# Patient Record
Sex: MALE | Race: WHITE
[De-identification: names, ages, dates, MRNs, and addresses within clinical notes are randomized per-mention and may not be internally consistent; named-entity substitution may affect disease eponyms.]

---

## 2018-10-11 ENCOUNTER — HOSPITAL ENCOUNTER (EMERGENCY)
Dept: HOSPITAL 33 - ED | Age: 63
Discharge: HOME | End: 2018-10-11
Payer: MEDICARE

## 2018-10-11 VITALS — OXYGEN SATURATION: 94 % | HEART RATE: 88 BPM | DIASTOLIC BLOOD PRESSURE: 86 MMHG | SYSTOLIC BLOOD PRESSURE: 166 MMHG

## 2018-10-11 DIAGNOSIS — N13.2: Primary | ICD-10-CM

## 2018-10-11 DIAGNOSIS — Z87.442: ICD-10-CM

## 2018-10-11 LAB
ALBUMIN SERPL-MCNC: 4 G/DL (ref 3.5–5)
ALP SERPL-CCNC: 116 U/L (ref 38–126)
ALT SERPL-CCNC: 39 U/L (ref 0–50)
AMYLASE SERPL-CCNC: 84 U/L (ref 30–110)
ANION GAP SERPL CALC-SCNC: 11.8 MEQ/L (ref 5–15)
AST SERPL QL: 50 U/L (ref 17–59)
BASOPHILS # BLD AUTO: 0.03 10*3/UL (ref 0–0.4)
BASOPHILS NFR BLD AUTO: 0.3 % (ref 0–0.4)
BILIRUB BLD-MCNC: 0.6 MG/DL (ref 0.2–1.3)
BUN SERPL-MCNC: 15 MG/DL (ref 9–20)
CALCIUM SPEC-MCNC: 9 MG/DL (ref 8.4–10.2)
CHLORIDE SERPL-SCNC: 103 MMOL/L (ref 98–107)
CO2 SERPL-SCNC: 26 MMOL/L (ref 22–30)
CREAT SERPL-MCNC: 0.92 MG/DL (ref 0.66–1.25)
EOSINOPHIL # BLD AUTO: 0.19 10*3/UL (ref 0–0.5)
GLUCOSE SERPL-MCNC: 95 MG/DL (ref 74–106)
GLUCOSE UR-MCNC: NEGATIVE MG/DL
GRANULOCYTES # BLD AUTO: 6.37 10*3/UL (ref 1.4–6.9)
HCT VFR BLD AUTO: 44.7 % (ref 42–50)
HGB BLD-MCNC: 15 GM/DL (ref 12.5–18)
LIPASE SERPL-CCNC: 464 U/L (ref 23–300)
LYMPHOCYTES # SPEC AUTO: 1.95 10*3/UL (ref 1–4.6)
MCH RBC QN AUTO: 28.5 PG (ref 26–32)
MCHC RBC AUTO-ENTMCNC: 33.6 G/DL (ref 32–36)
MONOCYTES # BLD AUTO: 0.95 10*3/UL (ref 0–1.3)
NEUTROPHILS NFR BLD AUTO: 67.2 % (ref 36–66)
PLATELET # BLD AUTO: 198 K/MM3 (ref 150–450)
POTASSIUM SERPLBLD-SCNC: 3.6 MMOL/L (ref 3.5–5.1)
PROT SERPL-MCNC: 6.7 G/DL (ref 6.3–8.2)
PROT UR STRIP-MCNC: 100 MG/DL
RBC # BLD AUTO: 5.26 M/MM3 (ref 4.1–5.6)
SODIUM SERPL-SCNC: 138 MMOL/L (ref 137–145)
WBC # BLD AUTO: 9.5 K/MM3 (ref 4–10.5)

## 2018-10-11 PROCEDURE — 96374 THER/PROPH/DIAG INJ IV PUSH: CPT

## 2018-10-11 PROCEDURE — 96360 HYDRATION IV INFUSION INIT: CPT

## 2018-10-11 PROCEDURE — 82150 ASSAY OF AMYLASE: CPT

## 2018-10-11 PROCEDURE — 96361 HYDRATE IV INFUSION ADD-ON: CPT

## 2018-10-11 PROCEDURE — 85025 COMPLETE CBC W/AUTO DIFF WBC: CPT

## 2018-10-11 PROCEDURE — 87040 BLOOD CULTURE FOR BACTERIA: CPT

## 2018-10-11 PROCEDURE — 80053 COMPREHEN METABOLIC PANEL: CPT

## 2018-10-11 PROCEDURE — 36000 PLACE NEEDLE IN VEIN: CPT

## 2018-10-11 PROCEDURE — 96375 TX/PRO/DX INJ NEW DRUG ADDON: CPT

## 2018-10-11 PROCEDURE — 83690 ASSAY OF LIPASE: CPT

## 2018-10-11 PROCEDURE — 81001 URINALYSIS AUTO W/SCOPE: CPT

## 2018-10-11 PROCEDURE — 74176 CT ABD & PELVIS W/O CONTRAST: CPT

## 2018-10-11 PROCEDURE — 87086 URINE CULTURE/COLONY COUNT: CPT

## 2018-10-11 PROCEDURE — 36415 COLL VENOUS BLD VENIPUNCTURE: CPT

## 2018-10-11 PROCEDURE — 99284 EMERGENCY DEPT VISIT MOD MDM: CPT

## 2018-10-11 NOTE — XRAY
Indication: Right lower quadrant pain.  History of stones.



Multiple contiguous axial images obtained through the abdomen and pelvis

without contrast using renal stone protocol.



Comparison: July 21, 2013.



Lung bases demonstrates minimal bibasilar dependent atelectasis with stable

tiny left posterior calcified granuloma.  No infiltrate or effusion.  Heart is

not enlarged.



There is now a 3 mm calculus in the proximal right ureter, approximately L3

level with mild hydronephrosis and mild perinephric stranding.  Stable

nonobstructing left renal micro-calculus.



Noncontrasted stomach and bowel loops appear nonobstructed.  Previous reported

appendectomy.  Again mild scattered descending and sigmoid diverticulosis.  No

free fluid/air.



Remaining liver, gallbladder, pancreas, spleen, adrenal glands, kidneys,

ureters, and bladder appear unremarkable for noncontrast exam.  There remains

mild aortoiliac calcifications without AAA.



Osseous structures intact with stable L5-S1 degenerative changes.  Interval

L1/L2 kyphoplasty with right L1/L2 cement material extending beyond the

anterior bony cortex.  Stable small fatty umbilical hernia.



Impression:

1.  New 3 mm proximal right ureter calculus producing obstructive uropathy as

detailed.  Stable nonobstructing left renal micro-calculus.

2.  Stable colonic diverticulosis and small fatty umbilical hernia.

3.  Interval L1/L2 kyphoplasty.



Comment: Preliminary interpretation was made by [facility].  No critical discrepancy.



CT DI 22.89

## 2018-10-11 NOTE — ERPHSYRPT
- History of Present Illness


Historian: patient


Exam Limitations: clinical condition (PATIENT )


Patient Subjective Stated Complaint: woke up with pain in the right lower 

abdomen


Triage Nursing Assessment: Pt arrived by EMS with c/o waking up with pain in 

the lower right quadrant of abdomen, hx of kidney stones, pain with palpation, 

unsure if radiates to the back due to chronic back pain, bowel sounds heard in 

all 4 quadrants


Timing/Duration: yesterday


Activities at Onset: none


Quality: sharpness, stabbing


Abdominal Pain Onset Location: RLQ, generalized abdomen


Pain Radiation: no radiation


Severity of Pain-Max: moderate


Severity of Pain-Current: moderate


Modifying Factors: Improves With: nothing, analgesics


Associated Symptoms: back, nausea


Hx Tetanus, Diphtheria Vaccination/Date Given: Yes


Hx Influenza Vaccination/Date Given: No


Hx Pneumococcal Vaccination/Date Given: No





<LIS MORENO - Last Filed: 10/11/18 06:53>





<ROSA MONTOYA - Last Filed: 10/11/18 07:21>





- History of Present Illness


Time Seen by Provider: 10/11/18 05:30


Physician History: 





PATIENT WITH HISTORY OF KIDNEY STONES,  DEGENERATIVE DISC DISEASE,  COMPLAINS 

OF RIGHT LOWER ABDOMINAL PAINS SINCE 2130,  ASSOCIATED WITH NAUSEA,  PAIN SCALE 

7/10.  DENIES FEVER, CHILLS, URINARY SYMPTOMS. PATIENT HAS HAD PREVIOUS 

LITHROTRIPSY AND RENAL STENT INSERTION. (LIS MORENO)


Allergies/Adverse Reactions: 








No Known Drug Allergies Allergy (Verified 10/11/18 05:25)


 





Home Medications: 








Hydrocodone/Acetaminophen [Norco 5-325 Tablet] 1 each PO Q6H 10/11/18 [History]








- Review of Systems


Constitutional: No Fever, No Chills


Eyes: No Symptoms


Ears, Nose, & Throat: No Symptoms


Respiratory: No Symptoms, No Cough, No Dyspnea


Cardiac: No Symptoms, No Chest Pain, No Edema, No Syncope


Abdominal/Gastrointestinal: Abdominal Pain, No Nausea, No Vomiting, No Diarrhea


Genitourinary Symptoms: No Symptoms, No Dysuria


Musculoskeletal: No Symptoms, No Back Pain, No Neck Pain


Skin: No Rash


Neurological: No Dizziness, No Focal Weakness, No Sensory Changes


Psychological: No Symptoms


Endocrine: No Symptoms


All Other Systems: Reviewed and Negative





<LIS MORENO - Last Filed: 10/11/18 06:53>





- Past Medical History


Pertinent Past Medical History: Yes


Neurological History: No Pertinent History


ENT History: No Pertinent History


Cardiac History: No Pertinent History, Myocardial Infarction (MI), Other


Respiratory History: No Pertinent History


Endocrine Medical History: No Pertinent History


Musculoskeletal History: No Pertinent History


GI Medical History: No Pertinent History, GI Bleed


 History: No Pertinent History


Psycho-Social History: No Pertinent History


Male Reproductive Disorders: No Pertinent History


Other Medical History: kidney stones x 3,mvp, reports heart attack years ago





- Past Surgical History


Past Surgical History: Yes


Neuro Surgical History: No Pertinent History


Cardiac: No Pertinent History


Respiratory: No Pertinent History


Gastrointestinal: Appendectomy, Bowel Surgery


Genitourinary: Other


Musculoskeletal: Orthopedic Surgery


Male Surgical History: No Pertinent History


Other Surgical History: Pt states "i had to have ,because of infection, some of 

my bowel and piece of the bladder removed and also appy at the same time".  

States "kyphoplasty L-3 and either L-1 or 2  Nov. 2016





- Social History


Smoking Status: Current every day smoker


How long have you smoked: 50 yrs


Exposure to second hand smoke: Yes


Drug Use: none


Patient Lives Alone: Yes





<TIFFANIELIS - Last Filed: 10/11/18 06:53>





- Physical Exam


General Appearance: moderate distress


Eye Exam: PERRL/EOMI, eyes nml inspection


Ears, Nose, Throat Exam: normal ENT inspection, pharynx normal, moist mucous 

membranes


Neck Exam: normal inspection, non-tender, supple, full range of motion


Respiratory Exam: normal breath sounds, lungs clear, No respiratory distress


Cardiovascular Exam: regular rate/rhythm, normal heart sounds


Gastrointestinal/Abdomen Exam: soft, normal bowel sounds, tenderness (RIGHT 

LOWER QUAD TENDERNESS), No mass


Back Exam: normal inspection, normal range of motion, No CVA tenderness, No 

vertebral tenderness


Extremity Exam: normal inspection, normal range of motion, pelvis stable


Neurologic Exam: alert, oriented x 3, cooperative, normal mood/affect, nml 

cerebellar function, sensation nml, No motor deficits


Skin Exam: normal color, warm, dry


**SpO2 Interpretation**: normal


SpO2: 97


Oxygen Delivery: Room Air





<TIFFANIELIS - Last Filed: 10/11/18 06:53>





- Nursing Vital Signs


Nursing Vital Signs: 





 Initial Vital Signs











Temperature  97.9 F   10/11/18 05:16


 


Pulse Rate  86   10/11/18 05:16


 


Blood Pressure  178/106   10/11/18 05:16


 


O2 Sat by Pulse Oximetry  97   10/11/18 05:16








 Pain Scale











Pain Intensity                 8

















<LIS MORENO - Last Filed: 10/11/18 06:53>





- Course


Nursing assessment & vital signs reviewed: Yes





<ROSA MONTOYA - Last Filed: 10/11/18 07:21>


Ordered Tests: 





 Active Orders 24 hr











 Category Date Time Status


 


 Clean Catch Urine Specimen STAT Care  10/11/18 05:37 Active


 


 IV Insertion STAT Care  10/11/18 05:37 Active


 


 ABDOMEN AND PELVIS W/0 CONTRAS [CT] Stat Exams  10/11/18 05:38 Taken


 


 AMYLASE Stat Lab  10/11/18 05:25 Completed


 


 BLOOD CULTURE Stat Lab  10/11/18 05:58 Received


 


 CBC W DIFF Stat Lab  10/11/18 05:25 Completed


 


 CMP Stat Lab  10/11/18 05:25 Completed


 


 LIPASE Stat Lab  10/11/18 05:25 Completed


 


 UA W/RFX UR CULTURE Stat Lab  10/11/18 06:50 Ordered








Medication Summary











Generic Name Dose Route Start Last Admin





  Trade Name Freq  PRN Reason Stop Dose Admin


 


Sodium Chloride  1,000 mls @ 500 mls/hr  10/11/18 05:37  10/11/18 05:46





  Sodium Chloride 0.9% 1000 Ml  IV  10/11/18 07:36  500 mls/hr





  .Q2H STA   Administration





     





     





     





     














Discontinued Medications














Generic Name Dose Route Start Last Admin





  Trade Name Freq  PRN Reason Stop Dose Admin


 


Hydromorphone HCl  1 mg  10/11/18 05:37  10/11/18 05:47





  Hydromorphone 1 Mg/Ml Ampule***  IV  10/11/18 05:38  1 mg





  STAT ONE   Administration





     





     





     





     


 


Hydromorphone HCl  Confirm  10/11/18 05:43  





  Hydromorphone 1 Mg/Ml Ampule***  Administered  10/11/18 05:44  





  Dose   





  1 mg   





  .ROUTE   





  .STK-MED ONE   





     





     





     





     


 


Sodium Chloride  Confirm  10/11/18 05:43  





  Sodium Chloride 0.9% 1000 Ml  Administered  10/11/18 05:44  





  Dose   





  1,000 mls @ ud   





  .ROUTE   





  .STK-MED ONE   





     





     





     





     


 


Ondansetron HCl  4 mg  10/11/18 05:37  10/11/18 05:47





  Zofran 4 Mg/2 Ml Vial**  IV  10/11/18 05:38  4 mg





  STAT ONE   Administration





     





     





     





     


 


Ondansetron HCl  Confirm  10/11/18 05:43  





  Zofran 4 Mg/2 Ml Vial**  Administered  10/11/18 05:44  





  Dose   





  4 mg   





  .ROUTE   





  .STK-MED ONE   





     





     





     





     











Lab/Rad Data: 





 Laboratory Result Diagrams





 10/11/18 05:25 





 10/11/18 05:25 





 Laboratory Results











  10/11/18 10/11/18 Range/Units





  05:25 05:25 


 


WBC   9.5  (4.0-10.5)  K/mm3


 


RBC   5.26  (4.1-5.6)  M/mm3


 


Hgb   15.0  (12.5-18.0)  gm/dl


 


Hct   44.7  (42-50)  %


 


MCV   85.0  ()  fl


 


MCH   28.5  (26-32)  pg


 


MCHC   33.6  (32-36)  g/dl


 


RDW   12.9  (11.5-14.0)  %


 


Plt Count   198  (150-450)  K/mm3


 


MPV   10.1 H  (6-9.5)  fl


 


Gran %   67.2 H  (36.0-66.0)  %


 


Eos # (Auto)   0.19  (0-0.5)  


 


Absolute Lymphs (auto)   1.95  (1.0-4.6)  


 


Absolute Monos (auto)   0.95  (0.0-1.3)  


 


Lymphocytes %   20.5 L  (24.0-44.0)  %


 


Monocytes %   10.0  (0.0-12.0)  %


 


Eosinophils %   2.0  (0.00-5.0)  %


 


Basophils %   0.3  (0.0-0.4)  %


 


Absolute Granulocytes   6.37  (1.4-6.9)  


 


Basophils #   0.03  (0-0.4)  


 


Sodium  138   (137-145)  mmol/L


 


Potassium  3.6   (3.5-5.1)  mmol/L


 


Chloride  103   ()  mmol/L


 


Carbon Dioxide  26   (22-30)  mmol/L


 


Anion Gap  11.8   (5-15)  MEQ/L


 


BUN  15   (9-20)  mg/dL


 


Creatinine  0.92   (0.66-1.25)  mg/dL


 


Estimated GFR  > 60.0   ML/MIN


 


Glucose  95   ()  mg/dL


 


Calcium  9.0   (8.4-10.2)  mg/dL


 


Total Bilirubin  0.60   (0.2-1.3)  mg/dL


 


AST  50   (17-59)  U/L


 


ALT  39   (0-50)  U/L


 


Alkaline Phosphatase  116   ()  U/L


 


Serum Total Protein  6.7   (6.3-8.2)  g/dL


 


Albumin  4.0   (3.5-5.0)  g/dL


 


Amylase  84   ()  U/L


 


Lipase  464 H   ()  U/L











ct scan abd/pelvis-mild right hydronephrosis and hydroureter with 3mm prox 

right ureteral stone (ROSA MONTOYA)





<LIS MORENO - Last Filed: 10/11/18 06:53>





- Progress


Counseled pt/family regarding: lab results, diagnosis, need for follow-up





<ROSA MONTOYA - Last Filed: 10/11/18 07:21>





- Progress


Progress Note: 





10/11/18 06:53


IV NORMAL SALINE 500ML/HR, ZOFRAN 4MG, DILAUDID 1MG IV, PAIN MARKEDLY IMPROVED.


10/11/18 07:00,  PATIENT ENDORSED TO DR MONTOYA AT 0700


 (LIS MORENO)








10/11/18 07:17


pt very comfortable.  (ROSA MONTOYA)





<LIS MORENO - Last Filed: 10/11/18 06:53>





- Departure


Time of Disposition: 07:18


Departure Disposition: Home


Critical Care Time: No





<ROSA MONTOYA - Last Filed: 10/11/18 07:21>





- Departure


Clinical Impression: 


 Ureterolithiasis





Condition: Stable


Referrals: 


KRISSY KENDALL [Primary Care Provider] - 


Additional Instructions: 


drink plenty of fluids. use your hydrocodone as prescribed. follow up with 

urologist for further management.

## 2020-02-27 ENCOUNTER — HOSPITAL ENCOUNTER (EMERGENCY)
Dept: HOSPITAL 33 - ED | Age: 65
Discharge: HOME | End: 2020-02-27
Payer: MEDICARE

## 2020-02-27 VITALS — DIASTOLIC BLOOD PRESSURE: 85 MMHG | HEART RATE: 81 BPM | SYSTOLIC BLOOD PRESSURE: 132 MMHG

## 2020-02-27 VITALS — OXYGEN SATURATION: 95 %

## 2020-02-27 DIAGNOSIS — J44.1: ICD-10-CM

## 2020-02-27 DIAGNOSIS — J11.1: Primary | ICD-10-CM

## 2020-02-27 LAB
ALBUMIN SERPL-MCNC: 4.1 G/DL (ref 3.5–5)
ALP SERPL-CCNC: 110 U/L (ref 38–126)
ALT SERPL-CCNC: 35 U/L (ref 0–50)
ANION GAP SERPL CALC-SCNC: 11.9 MEQ/L (ref 5–15)
AST SERPL QL: 52 U/L (ref 17–59)
BILIRUB BLD-MCNC: 0.6 MG/DL (ref 0.2–1.3)
BUN SERPL-MCNC: 12 MG/DL (ref 9–20)
CALCIUM SPEC-MCNC: 8.4 MG/DL (ref 8.4–10.2)
CELLS COUNTED: 100
CHLORIDE SERPL-SCNC: 105 MMOL/L (ref 98–107)
CO2 SERPL-SCNC: 24 MMOL/L (ref 22–30)
CREAT SERPL-MCNC: 0.94 MG/DL (ref 0.66–1.25)
FLUAV AG NPH QL IA: NEGATIVE
FLUBV AG NPH QL IA: POSITIVE
GLUCOSE SERPL-MCNC: 112 MG/DL (ref 74–106)
HCT VFR BLD AUTO: 45 % (ref 42–50)
HGB BLD-MCNC: 15 GM/DL (ref 12.5–18)
MANUAL DIF COMMENT BLD-IMP: NORMAL
MCH RBC QN AUTO: 28 PG (ref 26–32)
MCHC RBC AUTO-ENTMCNC: 33.3 G/DL (ref 32–36)
NEUTS BAND # BLD MANUAL: 4 % (ref 0–2)
PLATELET # BLD AUTO: 154 K/MM3 (ref 150–450)
POTASSIUM SERPLBLD-SCNC: 3.6 MMOL/L (ref 3.5–5.1)
PROT SERPL-MCNC: 7.3 G/DL (ref 6.3–8.2)
RBC # BLD AUTO: 5.36 M/MM3 (ref 4.1–5.6)
RSV AG SPEC QL IA: NEGATIVE
SODIUM SERPL-SCNC: 137 MMOL/L (ref 137–145)
WBC # BLD AUTO: 3.9 K/MM3 (ref 4–10.5)

## 2020-02-27 PROCEDURE — 94640 AIRWAY INHALATION TREATMENT: CPT

## 2020-02-27 PROCEDURE — 87651 STREP A DNA AMP PROBE: CPT

## 2020-02-27 PROCEDURE — 36415 COLL VENOUS BLD VENIPUNCTURE: CPT

## 2020-02-27 PROCEDURE — 99284 EMERGENCY DEPT VISIT MOD MDM: CPT

## 2020-02-27 PROCEDURE — 36000 PLACE NEEDLE IN VEIN: CPT

## 2020-02-27 PROCEDURE — 94150 VITAL CAPACITY TEST: CPT

## 2020-02-27 PROCEDURE — 87631 RESP VIRUS 3-5 TARGETS: CPT

## 2020-02-27 PROCEDURE — 96374 THER/PROPH/DIAG INJ IV PUSH: CPT

## 2020-02-27 PROCEDURE — 71046 X-RAY EXAM CHEST 2 VIEWS: CPT

## 2020-02-27 PROCEDURE — 85025 COMPLETE CBC W/AUTO DIFF WBC: CPT

## 2020-02-27 PROCEDURE — 93005 ELECTROCARDIOGRAM TRACING: CPT

## 2020-02-27 PROCEDURE — 80053 COMPREHEN METABOLIC PANEL: CPT

## 2020-02-27 PROCEDURE — 83605 ASSAY OF LACTIC ACID: CPT

## 2020-02-27 NOTE — XRAY
Exam: Two-view chest from 2/27/2020.



Comparison: None.



Indication: 64-year-old male with cough for 5 days, fever, no history of

surgery, smoker.



Findings: Upright PA and lateral chest films are submitted for evaluation.



The heart size and contour are normal.  Atherosclerotic calcification is seen

within the aortic knob.  The abrahan and mediastinal structures appear

unremarkable.  The lungs are well inflated.  No air space infiltrates,

vascular congestion, pneumothorax, or pleural fluid is seen.  There is minor

eventration of the anterior aspect of the right hemidiaphragm.  EKG leads are

seen in place.



There appears to have probably been prior vertebroplasty within the upper

lumbar spine.  Correlate with history.  The bones are demineralized, but

otherwise appear intact.



Impression:



1.  No infiltrates to suggest pneumonia or other acute cardiopulmonary disease

is seen.

## 2020-02-27 NOTE — ERPHSYRPT
- History of Present Illness


Time Seen by Provider: 02/27/20 11:11


Source: patient


Exam Limitations: no limitations


Patient Subjective Stated Complaint: states since sunday has had a cough and 

fever.  also having sharp pain under left scapula from coughing.  states cough 

is nonproductive.


Triage Nursing Assessment: ambulated to room per self.  skin w/d, color normal.

  resp nonlabored.  dry occasional cough noted.


Physician History: 





64 years old male with history of tobacco abuse presented in the ER with chief 

complaint of fever chills, nasal congestion/sore throat and worsening dry 

hacking cough for the last 4 days.  Patient has been using over-the-counter 

meds with no significant relief.  Because of repeated coughing he is having 

chest soreness and feels as if he pulled his back muscles as well.  Denies any 

abdominal pain nausea or vomiting.  Minimal shortness of breath which is not 

any worse than usual.  Denies any chest pain otherwise.  No palpitations.


Timing/Duration: day(s) (4)


Cough Quality/Degree: moderate, dry cough


Possible Cause: no prior episodes


Associated Symptoms: chest pain/soreness, cough, muscle aches, nasal congestion


Allergies/Adverse Reactions: 








No Known Drug Allergies Allergy (Verified 02/27/20 11:08)


 





Hx Tetanus, Diphtheria Vaccination/Date Given: Yes


Hx Influenza Vaccination/Date Given: No


Hx Pneumococcal Vaccination/Date Given: No





- Review of Systems


Constitutional: Fever, Chills, Fatigue, Malaise


Eyes: No Symptoms


Ears, Nose, & Throat: No Symptoms


Respiratory: No Symptoms, Cough, Dyspnea


Cardiac: No Symptoms


Abdominal/Gastrointestinal: No Symptoms


Genitourinary Symptoms: No Symptoms


Musculoskeletal: Back Pain, Myalgias


Skin: No Symptoms


Neurological: No Symptoms


Psychological: No Symptoms


Endocrine: No Symptoms


Hematologic/Lymphatic: No Symptoms


Immunological/Allergic: No Symptoms





- Past Medical History


Pertinent Past Medical History: Yes


Neurological History: No Pertinent History


ENT History: No Pertinent History


Cardiac History: No Pertinent History, Myocardial Infarction (MI), Other


Respiratory History: No Pertinent History


Endocrine Medical History: No Pertinent History


Musculoskeletal History: No Pertinent History


GI Medical History: No Pertinent History, GI Bleed


 History: No Pertinent History


Psycho-Social History: No Pertinent History


Male Reproductive Disorders: No Pertinent History


Other Medical History: kidney stones x 3,mvp, reports heart attack years ago





- Past Surgical History


Past Surgical History: Yes


Neuro Surgical History: No Pertinent History


Cardiac: No Pertinent History


Respiratory: No Pertinent History


Gastrointestinal: Appendectomy, Bowel Surgery


Genitourinary: Other


Musculoskeletal: Orthopedic Surgery


Male Surgical History: No Pertinent History


Other Surgical History: Pt states "i had to have ,because of infection, some of 

my bowel and piece of the bladder removed and also appy at the same time".  

States "kyphoplasty L-3 and either L-1 or 2  Nov. 2016





- Social History


Smoking Status: Former smoker


How long have you smoked: 50 yrs


Exposure to second hand smoke: No


Drug Use: none


Patient Lives Alone: Yes





- Nursing Vital Signs


Nursing Vital Signs: 


 Initial Vital Signs











Temperature  100.4 F   02/27/20 10:57


 


Pulse Rate  106 H  02/27/20 10:57


 


Respiratory Rate  18   02/27/20 10:57


 


Blood Pressure  166/93   02/27/20 10:57


 


O2 Sat by Pulse Oximetry  95   02/27/20 10:57








 Pain Scale











Pain Intensity                 0

















- Physical Exam


General Appearance: no apparent distress


Eye Exam: PERRL/EOMI, eyes nml inspection


Ears, Nose, Throat Exam: pharyngeal erythema


Neck Exam: normal inspection, non-tender, supple, full range of motion


Respiratory Exam: normal breath sounds, lungs clear, respiratory distress


Cardiovascular Exam: regular rate/rhythm, normal heart sounds


Gastrointestinal/Abdomen Exam: soft, normal bowel sounds


Back Exam: muscle spasm, No CVA tenderness


Extremity Exam: normal inspection


Neurologic Exam: alert, oriented x 3, cooperative


Skin Exam: normal color


**SpO2 Interpretation**: normal


SpO2: 95


O2 Delivery: Room Air





- Course


Nursing assessment & vital signs reviewed: Yes


EKG Interpreted by Me: RATE (99), NORMAL AXIS, NORMAL INTERVALS, NORMAL QRS, Q-

wave (inf leads)


Ordered Tests: 


 Active Orders 24 hr











 Category Date Time Status


 


 EKG-ER Only STAT Care  02/27/20 11:23 Active


 


 IV Insertion STAT Care  02/27/20 11:19 Active


 


 CHEST 2 VIEWS (PA AND LAT) Stat Exams  02/27/20 11:19 Completed


 


 CBC W DIFF Stat Lab  02/27/20 11:34 Completed


 


 CMP Stat Lab  02/27/20 11:34 Completed


 


 Lactic Acid Stat Lab  02/27/20 11:34 Completed


 


 Manual Differential NC Stat Lab  02/27/20 11:34 Completed


 


 Peak Expiratory Flow Rate ONCE RT  02/27/20 11:39 Completed


 


 Respiratory Therapy Assessment DAILY RT  02/27/20 11:39 Completed








Medication Summary














Discontinued Medications














Generic Name Dose Route Start Last Admin





  Trade Name Héctor  PRN Reason Stop Dose Admin


 


Acetaminophen  975 mg  02/27/20 11:19  02/27/20 11:26





  Tylenol 325 Mg***  PO  02/27/20 11:20  975 mg





  STAT ONE   Administration





     





     





     





     


 


Acetaminophen  Confirm  02/27/20 11:25  





  Tylenol 325 Mg***  Administered  02/27/20 11:26  





  Dose   





  975 mg   





  .ROUTE   





  .STK-MED ONE   





     





     





     





     


 


Albuterol/Ipratropium  3 ml  02/27/20 11:20  02/27/20 11:33





  Duoneb 0.5-3 Mg/3 Ml Neb**  IH  02/27/20 11:21  3 ml





  STAT ONE   Administration





     





     





     





     


 


Albuterol/Ipratropium  Confirm  02/27/20 11:30  





  Duoneb 0.5-3 Mg/3 Ml Neb**  Administered  02/27/20 11:31  





  Dose   





  3 ml   





  IH   





  .STK-MED ONE   





     





     





     





     


 


Methylprednisolone Sodium Succinate  125 mg  02/27/20 11:20  02/27/20 11:27





  Solu-Medrol 125 Mg***  IV  02/27/20 11:21  125 mg





  STAT ONE   Administration





     





     





     





     


 


Methylprednisolone Sodium Succinate  Confirm  02/27/20 11:25  





  Solu-Medrol 125 Mg***  Administered  02/27/20 11:26  





  Dose   





  125 mg   





  .ROUTE   





  .STK-MED ONE   





     





     





     





     


 


Oseltamivir Phosphate  75 mg  02/27/20 13:54  02/27/20 14:03





  Tamiflu 75mg Capsule***  PO  02/27/20 13:55  75 mg





  STAT ONE   Administration





     





     





     





     











Lab/Rad Data: 


 Laboratory Result Diagrams





 02/27/20 11:34 





 02/27/20 11:34 





 Laboratory Results











  02/27/20 02/27/20 02/27/20 Range/Units





  Unknown 12:20 11:34 


 


WBC     (4.0-10.5)  K/mm3


 


RBC     (4.1-5.6)  M/mm3


 


Hgb     (12.5-18.0)  gm/dl


 


Hct     (42-50)  %


 


MCV     ()  fl


 


MCH     (26-32)  pg


 


MCHC     (32-36)  g/dl


 


RDW     (11.5-14.0)  %


 


Plt Count     (150-450)  K/mm3


 


MPV     (7.5-11.0)  fl


 


Sodium    137  (137-145)  mmol/L


 


Potassium    3.6  (3.5-5.1)  mmol/L


 


Chloride    105  ()  mmol/L


 


Carbon Dioxide    24  (22-30)  mmol/L


 


Anion Gap    11.9  (5-15)  MEQ/L


 


BUN    12  (9-20)  mg/dL


 


Creatinine    0.94  (0.66-1.25)  mg/dL


 


Estimated GFR    > 60.0  ML/MIN


 


Glucose    112 H  ()  mg/dL


 


Lactic Acid     (0.4-2.0)  


 


Calcium    8.4  (8.4-10.2)  mg/dL


 


Total Bilirubin    0.60  (0.2-1.3)  mg/dL


 


AST    52  (17-59)  U/L


 


ALT    35  (0-50)  U/L


 


Alkaline Phosphatase    110  ()  U/L


 


Serum Total Protein    7.3  (6.3-8.2)  g/dL


 


Albumin    4.1  (3.5-5.0)  g/dL


 


Influenza Type A Ag  NEGATIVE    (NEGATIVE)  


 


Influenza Type B Ag  POSITIVE    (NEGATIVE)  


 


RSV (PCR)  NEGATIVE    (Negative)  


 


Group A Strep Antibody   NEGATIVE   (NEGATIVE)  














  02/27/20 02/27/20 Range/Units





  11:34 11:34 


 


WBC   3.9 L  (4.0-10.5)  K/mm3


 


RBC   5.36  (4.1-5.6)  M/mm3


 


Hgb   15.0  (12.5-18.0)  gm/dl


 


Hct   45.0  (42-50)  %


 


MCV   84.0  ()  fl


 


MCH   28.0  (26-32)  pg


 


MCHC   33.3  (32-36)  g/dl


 


RDW   13.0  (11.5-14.0)  %


 


Plt Count   154  (150-450)  K/mm3


 


MPV   10.7  (7.5-11.0)  fl


 


Sodium    (137-145)  mmol/L


 


Potassium    (3.5-5.1)  mmol/L


 


Chloride    ()  mmol/L


 


Carbon Dioxide    (22-30)  mmol/L


 


Anion Gap    (5-15)  MEQ/L


 


BUN    (9-20)  mg/dL


 


Creatinine    (0.66-1.25)  mg/dL


 


Estimated GFR    ML/MIN


 


Glucose    ()  mg/dL


 


Lactic Acid  1.2   (0.4-2.0)  


 


Calcium    (8.4-10.2)  mg/dL


 


Total Bilirubin    (0.2-1.3)  mg/dL


 


AST    (17-59)  U/L


 


ALT    (0-50)  U/L


 


Alkaline Phosphatase    ()  U/L


 


Serum Total Protein    (6.3-8.2)  g/dL


 


Albumin    (3.5-5.0)  g/dL


 


Influenza Type A Ag    (NEGATIVE)  


 


Influenza Type B Ag    (NEGATIVE)  


 


RSV (PCR)    (Negative)  


 


Group A Strep Antibody    (NEGATIVE)  














- Progress


Progress: improved, re-examined


Air Movement: good


Progress Note: 


64 years old is evaluated for fever chills and cough.  He is given breathing 

treatment and steroid, on reevaluation his wheezing is better.  Chest x-ray to 

rule out any pneumonic infiltrates.  He has a positive influenza B and started 

on Tamiflu.  I would also give him a short course of steroid and inhaler to go 

home as patient continues to smoke and has COPD with some exacerbation.  I do 

not think patient needs admission.  He is maintaining his oxygen saturation at 

room air and is not in any distress at all.  Stable for discharge with 

outpatient follow-up.


02/27/20 14:04





Blood Culture(s) Obtained: No


Antibiotics given: No


Counseled pt/family regarding: lab results, diagnosis, need for follow-up, rad 

results, smoking cessation





- Departure


Departure Disposition: Home


Clinical Impression: 


 Influenza B, COPD exacerbation





Condition: Stable


Critical Care Time: No


Referrals: 


KRISSY KENDALL [Primary Care Provider] - Follow Up with PCP/3 days


Instructions:  Chronic Obstructive Pulmonary Disease, Flu, Adult (DC)


Additional Instructions: 


Take Tylenol as needed.  Follow-up with your primary care for reevaluation.  

Return to ER for any worsening.


Prescriptions: 


Albuterol 8 gm Mdi Hfa*** [Ventolin Hfa MDI***] 8 gm IH Q4H #1 hfa.aer.ad


Oseltamivir 75 mg*** [Tamiflu 75MG Capsule***] 75 mg PO BID #9 cap


Prednisone 50 mg PO DAILY #5 tablet

## 2021-02-26 ENCOUNTER — HOSPITAL ENCOUNTER (EMERGENCY)
Dept: HOSPITAL 33 - ED | Age: 66
Discharge: HOME | End: 2021-02-26
Payer: MEDICARE

## 2021-02-26 VITALS — DIASTOLIC BLOOD PRESSURE: 87 MMHG | HEART RATE: 83 BPM | SYSTOLIC BLOOD PRESSURE: 146 MMHG

## 2021-02-26 VITALS — OXYGEN SATURATION: 95 %

## 2021-02-26 DIAGNOSIS — Z87.442: ICD-10-CM

## 2021-02-26 DIAGNOSIS — I25.2: ICD-10-CM

## 2021-02-26 DIAGNOSIS — F17.210: ICD-10-CM

## 2021-02-26 DIAGNOSIS — R10.30: Primary | ICD-10-CM

## 2021-02-26 DIAGNOSIS — D72.829: ICD-10-CM

## 2021-02-26 LAB
ALBUMIN SERPL-MCNC: 3.9 G/DL (ref 3.5–5)
ALP SERPL-CCNC: 114 U/L (ref 38–126)
ALT SERPL-CCNC: 49 U/L (ref 0–50)
AMYLASE SERPL-CCNC: 53 U/L (ref 30–110)
ANION GAP SERPL CALC-SCNC: 10.3 MEQ/L (ref 5–15)
AST SERPL QL: 34 U/L (ref 17–59)
BASOPHILS # BLD AUTO: 0.03 10*3/UL (ref 0–0.4)
BASOPHILS NFR BLD AUTO: 0.3 % (ref 0–0.4)
BILIRUB BLD-MCNC: 0.9 MG/DL (ref 0.2–1.3)
BUN SERPL-MCNC: 12 MG/DL (ref 9–20)
CALCIUM SPEC-MCNC: 8.8 MG/DL (ref 8.4–10.2)
CHLORIDE SERPL-SCNC: 105 MMOL/L (ref 98–107)
CO2 SERPL-SCNC: 24 MMOL/L (ref 22–30)
CREAT SERPL-MCNC: 0.85 MG/DL (ref 0.66–1.25)
EOSINOPHIL # BLD AUTO: 0.12 10*3/UL (ref 0–0.5)
GFR SERPLBLD BASED ON 1.73 SQ M-ARVRAT: > 60 ML/MIN
GLUCOSE SERPL-MCNC: 102 MG/DL (ref 74–106)
GLUCOSE UR-MCNC: NEGATIVE MG/DL
HCT VFR BLD AUTO: 42.8 % (ref 42–50)
HGB BLD-MCNC: 13.8 GM/DL (ref 12.5–18)
LIPASE SERPL-CCNC: 59 U/L (ref 23–300)
LYMPHOCYTES # SPEC AUTO: 2.04 10*3/UL (ref 1–4.6)
MCH RBC QN AUTO: 27.9 PG (ref 26–32)
MCHC RBC AUTO-ENTMCNC: 32.2 G/DL (ref 32–36)
MONOCYTES # BLD AUTO: 1.18 10*3/UL (ref 0–1.3)
PLATELET # BLD AUTO: 213 K/MM3 (ref 150–450)
POTASSIUM SERPLBLD-SCNC: 4.2 MMOL/L (ref 3.5–5.1)
PROT SERPL-MCNC: 6.7 G/DL (ref 6.3–8.2)
PROT UR STRIP-MCNC: NEGATIVE MG/DL
RBC # BLD AUTO: 4.95 M/MM3 (ref 4.1–5.6)
RBC #/AREA URNS HPF: (no result) /HPF (ref 0–2)
SODIUM SERPL-SCNC: 135 MMOL/L (ref 137–145)
WBC # BLD AUTO: 10.9 K/MM3 (ref 4–10.5)
WBC #/AREA URNS HPF: (no result) /HPF (ref 0–5)

## 2021-02-26 PROCEDURE — 82150 ASSAY OF AMYLASE: CPT

## 2021-02-26 PROCEDURE — 74176 CT ABD & PELVIS W/O CONTRAST: CPT

## 2021-02-26 PROCEDURE — 80053 COMPREHEN METABOLIC PANEL: CPT

## 2021-02-26 PROCEDURE — 87086 URINE CULTURE/COLONY COUNT: CPT

## 2021-02-26 PROCEDURE — 85025 COMPLETE CBC W/AUTO DIFF WBC: CPT

## 2021-02-26 PROCEDURE — 81001 URINALYSIS AUTO W/SCOPE: CPT

## 2021-02-26 PROCEDURE — 83690 ASSAY OF LIPASE: CPT

## 2021-02-26 PROCEDURE — 83605 ASSAY OF LACTIC ACID: CPT

## 2021-02-26 PROCEDURE — 36415 COLL VENOUS BLD VENIPUNCTURE: CPT

## 2021-02-26 PROCEDURE — 96360 HYDRATION IV INFUSION INIT: CPT

## 2021-02-26 PROCEDURE — 36000 PLACE NEEDLE IN VEIN: CPT

## 2021-02-26 PROCEDURE — 51702 INSERT TEMP BLADDER CATH: CPT

## 2021-02-26 PROCEDURE — 99284 EMERGENCY DEPT VISIT MOD MDM: CPT

## 2021-02-26 PROCEDURE — 96374 THER/PROPH/DIAG INJ IV PUSH: CPT

## 2021-02-26 PROCEDURE — 96375 TX/PRO/DX INJ NEW DRUG ADDON: CPT

## 2021-02-26 NOTE — ERPHSYRPT
- History of Present Illness


Time Seen by Provider: 02/26/21 07:00


Historian: patient


Exam Limitations: no limitations


Patient Subjective Stated Complaint: pt states he began having abd pain below 

his umbilicus last night. states pain is sharp with cough and worse when sitting

up straight


Triage Nursing Assessment: pt alert and oriented, answers questions approp. pt 

arrive per ambulance, transfers to stretcher with assist of 3. pt tolerated wel

l. skin warm and dry. respirations nonlabored. abd soft. pt reprts tenderness to

lt lower with light palpation. bowel sounds present and hypo. pt reports passing

flatus and having loose bowel movements which is his normal.


Physician History: 





This is a 65-year-old white male who presents with 1 day history of worsening 

infrapubic abdominal pain.  Patient has had a history of's bowel resection, 

appendectomy and partial bladder resection in the past.  Approximately 2 to 3 

months ago patient was at [facility] in an inpatient setting because 

of diverticulitis.  Patient smokes tobacco and has a history of COPD as well as 

coronary artery disease.  He said a history of ureteral stones as well.  Patient

denies shortness of breath, he denies cough, he denies chest pain.  He has had 

no fevers or chills.


Timing/Duration: yesterday


Activities at Onset: none


Quality: sharpness, stabbing


Abdominal Pain Onset Location: other (Bilateral inferior pubic)


Pain Radiation: no radiation


Severity of Pain-Max: moderate


Severity of Pain-Current: moderate


Modifying Factors: Improves With: nothing


Associated Symptoms: denies symptoms


Previous symptoms: same symptoms as today


Allergies/Adverse Reactions: 








No Known Drug Allergies Allergy (Verified 02/26/21 06:43)


   





Hx Tetanus, Diphtheria Vaccination/Date Given: Yes


Hx Influenza Vaccination/Date Given: No


Hx Pneumococcal Vaccination/Date Given: No


Immunizations Up to Date: Yes





Travel Risk





- International Travel


Have you traveled outside of the country in past 3 weeks: No





- Coronavirus Screening


Are you exhibiting any of the following symptoms?: No


Close contact with a COVID-19 positive Pt in past 14-21 Days: No





- Review of Systems


Constitutional: No Symptoms


Eyes: No Symptoms


Ears, Nose, & Throat: No Symptoms


Respiratory: No Symptoms


Cardiac: No Symptoms


Abdominal/Gastrointestinal: Abdominal Pain, No Nausea, No Vomiting, No Diarrhea


Genitourinary Symptoms: No Symptoms


Musculoskeletal: No Symptoms


Skin: No Symptoms


Neurological: No Symptoms


Psychological: No Symptoms


Endocrine: No Symptoms


Hematologic/Lymphatic: No Symptoms


Immunological/Allergic: No Symptoms


All Other Systems: Reviewed and Negative





- Past Medical History


Pertinent Past Medical History: Yes


Neurological History: No Pertinent History


ENT History: No Pertinent History


Cardiac History: No Pertinent History, Myocardial Infarction (MI), Other


Respiratory History: No Pertinent History


Endocrine Medical History: No Pertinent History


Musculoskeletal History: No Pertinent History


GI Medical History: No Pertinent History, GI Bleed


 History: No Pertinent History


Psycho-Social History: No Pertinent History


Male Reproductive Disorders: No Pertinent History


Other Medical History: kidney stones x 3,mvp, reports heart attack years ago





- Past Surgical History


Past Surgical History: Yes


Neuro Surgical History: No Pertinent History


Cardiac: No Pertinent History


Respiratory: No Pertinent History


Gastrointestinal: Appendectomy, Bowel Surgery


Genitourinary: Other


Musculoskeletal: Orthopedic Surgery


Male Surgical History: No Pertinent History


Other Surgical History: Pt states "i had to have ,because of infection, some of 

my bowel and piece of the bladder removed and also appy at the same time".  

States "kyphoplasty L-3 and either L-1 or 2  Nov. 2016.  back surgery x4





- Social History


Smoking Status: Current every day smoker


How long have you smoked: 50 yrs


Exposure to second hand smoke: No


Drug Use: none


Patient Lives Alone: Yes





- Nursing Vital Signs


Nursing Vital Signs: 


                               Initial Vital Signs











Temperature  98.1 F   02/26/21 06:26


 


Pulse Rate  95 H  02/26/21 06:26


 


Respiratory Rate  16   02/26/21 06:26


 


Blood Pressure  173/97   02/26/21 06:26


 


O2 Sat by Pulse Oximetry  95   02/26/21 06:26








                                   Pain Scale











Pain Intensity                 0

















- Physical Exam


General Appearance: no apparent distress, alert, anxiety


Eye Exam: PERRL/EOMI, eyes nml inspection


Ears, Nose, Throat Exam: normal ENT inspection, moist mucous membranes


Neck Exam: normal inspection, non-tender, supple, full range of motion


Respiratory Exam: normal breath sounds, lungs clear, airway intact, No chest 

tenderness, No respiratory distress


Cardiovascular Exam: regular rate/rhythm, normal heart sounds, normal peripheral

pulses


Gastrointestinal/Abdomen Exam: soft, normal bowel sounds, tenderness (Bilateral 

infraumbilical), guarding, No distention, No rebound


Male Genitalia Exam: normal genitalia, No hernia, No testicular tenderness


Rectal Exam: not done


Back Exam: normal inspection, normal range of motion, No CVA tenderness, No 

vertebral tenderness


Extremity Exam: normal inspection, normal range of motion, pelvis stable


Neurologic Exam: alert, oriented x 3, cooperative, CNs II-XII nml as tested, 

normal mood/affect, nml cerebellar function, nml station & gait, sensation nml


Skin Exam: normal color, warm, dry


Lymphatic Exam: No adenopathy


**SpO2 Interpretation**: normal


SpO2: 95





- Course


Nursing assessment & vital signs reviewed: Yes


Ordered Tests: 


                               Active Orders 24 hr











 Category Date Time Status


 


 Catheter-[place] Acosta STAT Care  02/26/21 07:16 Active


 


 IV Insertion STAT Care  02/26/21 07:16 Active


 


 ABDOMEN AND PELVIS W/0 CONTRAS [CT] Stat Exams  02/26/21 07:16 Taken


 


 AMYLASE Stat Lab  02/26/21 07:30 Completed


 


 CBC W DIFF Stat Lab  02/26/21 07:30 Completed


 


 CMP Stat Lab  02/26/21 07:30 Completed


 


 CULTURE,URINE Stat Lab  02/26/21 07:16 Ordered


 


 LIPASE Stat Lab  02/26/21 07:30 Completed


 


 Lactic Acid Stat Lab  02/26/21 07:30 Completed


 


 UA W/RFX UR CULTURE Stat Lab  02/26/21 07:27 Completed








Medication Summary














Discontinued Medications














Generic Name Dose Route Start Last Admin





  Trade Name Freq  PRN Reason Stop Dose Admin


 


Hydromorphone HCl  1 mg  02/26/21 07:16  02/26/21 07:25





  Hydromorphone 1 Mg/Ml Injection***  IV  02/26/21 07:17  1 mg





  STAT ONE   Administration


 


Hydromorphone HCl  Confirm  02/26/21 07:23 





  Hydromorphone 1 Mg/Ml Injection***  Administered  02/26/21 07:24 





  Dose  





  1 mg  





  .ROUTE  





  .STK-MED ONE  


 


Sodium Chloride  1,000 mls @ 999 mls/hr  02/26/21 07:16  02/26/21 08:45





  Sodium Chloride 0.9% 1000 Ml  IV  02/26/21 08:16  Infused





  .Q1H1M STA   Infusion


 


Sodium Chloride  Confirm  02/26/21 07:23 





  Sodium Chloride 0.9% 1000 Ml  Administered  02/26/21 07:24 





  Dose  





  1,000 mls @ ud  





  .ROUTE  





  .STK-MED ONE  


 


Ondansetron HCl  4 mg  02/26/21 07:16  02/26/21 07:24





  Zofran 4 Mg/2 Ml Vial**  IV  02/26/21 07:17  4 mg





  STAT ONE   Administration


 


Ondansetron HCl  Confirm  02/26/21 07:22 





  Zofran 4 Mg/2 Ml Vial**  Administered  02/26/21 07:23 





  Dose  





  4 mg  





  .ROUTE  





  .STK-MED ONE  











Lab/Rad Data: 


                           Laboratory Result Diagrams





                                 02/26/21 07:30 





                                 02/26/21 07:30 





                               Laboratory Results











  02/26/21 02/26/21 02/26/21 Range/Units





  07:30 07:30 07:30 


 


WBC    10.9 H  (4.0-10.5)  K/mm3


 


RBC    4.95  (4.1-5.6)  M/mm3


 


Hgb    13.8  (12.5-18.0)  gm/dl


 


Hct    42.8  (42-50)  %


 


MCV    86.5  ()  fl


 


MCH    27.9  (26-32)  pg


 


MCHC    32.2  (32-36)  g/dl


 


RDW    12.8  (11.5-14.0)  %


 


Plt Count    213  (150-450)  K/mm3


 


MPV    9.5  (7.5-11.0)  fl


 


Gran %    68.9 H  (36.0-66.0)  %


 


Eos # (Auto)    0.12  (0-0.5)  


 


Absolute Lymphs (auto)    2.04  (1.0-4.6)  


 


Absolute Monos (auto)    1.18  (0.0-1.3)  


 


Lymphocytes %    18.8 L  (24.0-44.0)  %


 


Monocytes %    10.9  (0.0-12.0)  %


 


Eosinophils %    1.1  (0.00-5.0)  %


 


Basophils %    0.3  (0.0-0.4)  %


 


Absolute Granulocytes    7.49 H  (1.4-6.9)  


 


Basophils #    0.03  (0-0.4)  


 


Sodium  135 L    (137-145)  mmol/L


 


Potassium  4.2    (3.5-5.1)  mmol/L


 


Chloride  105    ()  mmol/L


 


Carbon Dioxide  24    (22-30)  mmol/L


 


Anion Gap  10.3    (5-15)  MEQ/L


 


BUN  12    (9-20)  mg/dL


 


Creatinine  0.85    (0.66-1.25)  mg/dL


 


Estimated GFR  > 60.0    ML/MIN


 


Glucose  102    ()  mg/dL


 


Lactic Acid   0.9   (0.4-2.0)  


 


Calcium  8.8    (8.4-10.2)  mg/dL


 


Total Bilirubin  0.90    (0.2-1.3)  mg/dL


 


AST  34    (17-59)  U/L


 


ALT  49    (0-50)  U/L


 


Alkaline Phosphatase  114    ()  U/L


 


Serum Total Protein  6.7    (6.3-8.2)  g/dL


 


Albumin  3.9    (3.5-5.0)  g/dL


 


Amylase  53    ()  U/L


 


Lipase  59    ()  U/L


 


Urine Color     (YELLOW)  


 


Urine Appearance     (CLEAR)  


 


Urine pH     (5-6)  


 


Ur Specific Gravity     (1.005-1.025)  


 


Urine Protein     (Negative)  


 


Urine Ketones     (NEGATIVE)  


 


Urine Blood     (0-5)  Wale/ul


 


Urine Nitrite     (NEGATIVE)  


 


Urine Bilirubin     (NEGATIVE)  


 


Urine Urobilinogen     (0-1)  mg/dL


 


Ur Leukocyte Esterase     (NEGATIVE)  


 


Urine WBC (Auto)     (0-5)  /HPF


 


Urine RBC (Auto)     (0-2)  /HPF


 


U Epithel Cells (Auto)     (FEW)  /HPF


 


Urine Bacteria (Auto)     (NEGATIVE)  /HPF


 


Urine Mucus (Auto)     (NEGATIVE)  /HPF


 


Urine Culture Reflexed     (NO)  


 


Urine Glucose     (NEGATIVE)  mg/dL














  02/26/21 Range/Units





  07:27 


 


WBC   (4.0-10.5)  K/mm3


 


RBC   (4.1-5.6)  M/mm3


 


Hgb   (12.5-18.0)  gm/dl


 


Hct   (42-50)  %


 


MCV   ()  fl


 


MCH   (26-32)  pg


 


MCHC   (32-36)  g/dl


 


RDW   (11.5-14.0)  %


 


Plt Count   (150-450)  K/mm3


 


MPV   (7.5-11.0)  fl


 


Gran %   (36.0-66.0)  %


 


Eos # (Auto)   (0-0.5)  


 


Absolute Lymphs (auto)   (1.0-4.6)  


 


Absolute Monos (auto)   (0.0-1.3)  


 


Lymphocytes %   (24.0-44.0)  %


 


Monocytes %   (0.0-12.0)  %


 


Eosinophils %   (0.00-5.0)  %


 


Basophils %   (0.0-0.4)  %


 


Absolute Granulocytes   (1.4-6.9)  


 


Basophils #   (0-0.4)  


 


Sodium   (137-145)  mmol/L


 


Potassium   (3.5-5.1)  mmol/L


 


Chloride   ()  mmol/L


 


Carbon Dioxide   (22-30)  mmol/L


 


Anion Gap   (5-15)  MEQ/L


 


BUN   (9-20)  mg/dL


 


Creatinine   (0.66-1.25)  mg/dL


 


Estimated GFR   ML/MIN


 


Glucose   ()  mg/dL


 


Lactic Acid   (0.4-2.0)  


 


Calcium   (8.4-10.2)  mg/dL


 


Total Bilirubin   (0.2-1.3)  mg/dL


 


AST   (17-59)  U/L


 


ALT   (0-50)  U/L


 


Alkaline Phosphatase   ()  U/L


 


Serum Total Protein   (6.3-8.2)  g/dL


 


Albumin   (3.5-5.0)  g/dL


 


Amylase   ()  U/L


 


Lipase   ()  U/L


 


Urine Color  YELLOW  (YELLOW)  


 


Urine Appearance  CLEAR  (CLEAR)  


 


Urine pH  7.0  (5-6)  


 


Ur Specific Gravity  1.017  (1.005-1.025)  


 


Urine Protein  NEGATIVE  (Negative)  


 


Urine Ketones  NEGATIVE  (NEGATIVE)  


 


Urine Blood  SMALL  (0-5)  Wale/ul


 


Urine Nitrite  NEGATIVE  (NEGATIVE)  


 


Urine Bilirubin  NEGATIVE  (NEGATIVE)  


 


Urine Urobilinogen  NEGATIVE  (0-1)  mg/dL


 


Ur Leukocyte Esterase  NEGATIVE  (NEGATIVE)  


 


Urine WBC (Auto)  3-5  (0-5)  /HPF


 


Urine RBC (Auto)  6-10  (0-2)  /HPF


 


U Epithel Cells (Auto)  NONE  (FEW)  /HPF


 


Urine Bacteria (Auto)  NONE SEEN  (NEGATIVE)  /HPF


 


Urine Mucus (Auto)  SLIGHT  (NEGATIVE)  /HPF


 


Urine Culture Reflexed  ORDERED SEPARATELY  (NO)  


 


Urine Glucose  NEGATIVE  (NEGATIVE)  mg/dL














- Progress


Progress: improved


Progress Note: 





02/26/21 09:02


Medical decision making: I spoke with the radiologist Dr. Holguin regarding the 

results of this patient's CAT scan of the abdomen and pelvis (noncontrast).  

Compared to a CAT scan of the abdomen pelvis without contrast 2-1/2 years ago, 

there is hint of stranding near the left psoas muscle.  There is diverticulosis 

but no evidence of diverticulitis.  The patient is afebrile he does not have any

 evidence of pancreatitis or cholecystitis.  We will write a prescription for 

antibiotics and a couple days of Norco pain medicine for this patient.  He will 

follow up with his primary care physician Dr. Young.


Counseled pt/family regarding: lab results, diagnosis, need for follow-up, rad 

results





- Departure


Departure Disposition: Home


Clinical Impression: 


 Abdominal pain, Leukocytosis, unspecified





Condition: Stable


Critical Care Time: No


Referrals: 


KRISSY KENDALL [Primary Care Provider] - 


Additional Instructions: 


Drink plenty of fluids.  Take your medication as prescribed.  Call Dr. Young's

office today to make an appointment for next week.


Prescriptions: 


Hydrocodone/APAP 5/325*** [Norco 5/325 mg***] 1 each PO Q8H PRN PRN #6 tablet 

MDD 3


 PRN Reason: Pain


Ciprofloxacin [Cipro 500 MG***] 500 mg PO BID #14 tablet


Metronidazole 500 mg*** [Flagyl 500 MG***] 500 mg PO TID #21 tablet

## 2021-06-03 NOTE — XRAY
Exam: CT of the abdomen and pelvis without IV contrast from 2/26/2021.

       CTDI:  7.23 mGy



Comparison: CT of the abdomen and pelvis without IV contrast from 10/11/2018.



Indication: 65-year-old male with lower abdominal pain since yesterday,

chronic diarrhea.  The patient has a history of prior appendectomy, 4 "back"

surgeries, partial small bowel resection, and unspecified "bladder" surgery.



Technique: Non-IV contrast axial images were obtained through the abdomen and

pelvis.  No oral contrast was given.  The patient was unable to fully raise

his right arm up out of the field of view of the upper abdomen.  Reconstructed

coronal and sagittal images were created and reviewed.



Findings: Assessment of the solid organs is limited without the use of IV

contrast.  The visualized lung bases reveal a small subpleural calcification

at the posterior left lung base representing no change.  Minimal compression

atelectasis is seen at the posterior right lung base.  The heart size appears

within normal limits.  Some left coronary artery vascular calcification is

seen on image #2.  There is a mild left ventricular prominence.  Minimal

eventration of the anterior aspect of the right hemidiaphragm is seen.



The liver appears of normal size and reveals no gross mass or intrahepatic

biliary duct distention.  The gallbladder is mildly distended on images #30

and #31, and there are several tiny soft tissue densities seen within the

gallbladder neck of each equivocal but unknown significance.  Certainly dense

calcium is not seen within the gallbladder lumen.  These could possibly

represent tiny gallstones or sludge within the gallbladder neck.  No

gallbladder wall thickening is seen.



The spleen appears unremarkable.  No abnormality of the pancreas or adrenal

glands is seen.  I again see a 4.6 mm nonobstructing calculus within the

posterior middle third of the left kidney representing no change from

10/11/2018.  This is not causing obstruction.  There is also a tiny

calcification within the inferior pole of the left kidney which in retrospect

is unchanged from 10/11/2018.  No right renal calculus is seen.  The remainder

of the renal parenchyma appears unremarkable bilaterally on this noncontrast

study.



Prior proximal right ureteral stone on 10/11/2018 is no longer seen.  The

ureters can be followed throughout their course bilaterally and appear

unremarkable.  No ureterolith is seen.  The urinary bladder is mostly

contracted due to placement of a urinary Acosta catheter with distal balloon.



Mild atherosclerotic vascular calcification is seen within the abdominal aorta

and iliac arteries.  No abdominal aortic aneurysm or abnormal retroperitoneal

lymphadenopathy is seen.  Abundant intraperitoneal fat is seen.  There is no

free intraperitoneal air.  A tiny fat-containing umbilical hernia is seen.



The appendix is surgically absent.  No bowel distention or definite bowel wall

thickening is seen.  I note mild diverticulosis without diverticulitis within

the distal descending colon and throughout the sigmoid colon representing no

change.



No pelvic mass, abnormal pelvic lymphadenopathy, or free intraperitoneal fluid

is seen.  However, I note some subtle asymmetric stranding within the left

pelvic sidewall just medial to the iliac vessels and tracking superiorly

anterior to the left psoas muscle.  This is not seen on the prior study from

10/11/2018.  Again, there is no associated mass, and this does not appear to

be related to the sigmoid colon.  I am not sure what this represents.  Perhaps

it represents nonspecific inflammation or scarring.



The seminal vesicles and prostate gland appear unremarkable.  The inguinal

regions reveal no abnormality.  No definite abnormalities seen within the

visualized perineum.  Common femoral artery vascular calcification is seen.



The skeleton again reveals evidence of prior kyphoplasty at L1 and L2.  There

appears to be interval L5-S1 surgery with placement of a posterior bella and 2

metallic screws on the right side.  There are also several tiny punctate

densities within the left aspect of L5-S1 which may relate to a spacer device.

 Correlate with surgical history.  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

at L5 without spondylolisthesis which is unchanged.  Moderate to marked

degenerative disc disease is again seen at L5-S1.  Lower thoracic vertebral

endplate spurring is seen.  I see no fracture or aggressive bone lesion.



Impression:



1.  Prior 3 mm stone within the proximal right ureter on 10/11/2018 is no

longer seen.  There is no evidence of right renal calculus or ureterolith

within either ureter on the current study.  I again see a nonobstructing stone

within the posterior middle third of the left kidney.  There is also a tiny

nonobstructing calcification within the inferior pole of the left kidney, best

identified on coronal images #91 and #92.  In retrospect, I believe this can

be seen on coronal image #94 from 10/11/2018 (i.e. no change).



2.  I note some new nonspecific linear stranding within the left pelvic

sidewall tracking superiorly along the anterior aspect of the psoas muscle.

This is new from 10/11/2018.  This could relate to some inflammation/edema or

scarring.  Consider the possibility of a recently passed left renal stone.  I

see no mass or abscess.  There is no free fluid within the pelvis.



3.  A urinary Acosta catheter balloon is seen within a collapsed urinary

bladder.



4.  Mild diverticulosis of the distal descending colon and throughout the

sigmoid colon without evidence of acute diverticulitis.



5.  The patient is status post appendectomy by history.  This structure is not

identified.



6.  Question of several tiny soft tissue densities within the gallbladder

neck.  This is nonspecific.  The possibility of some minimal biliary sludge or

tiny noncalcified gallstones is not completely excluded.



7.  No other acute process is seen within the abdomen or pelvis.



8.  Evidence of prior lumbar surgery, as discussed above. reading glasses/no diplopia/no photophobia/no blurred vision R

## 2021-06-07 ENCOUNTER — HOSPITAL ENCOUNTER (EMERGENCY)
Dept: HOSPITAL 33 - ED | Age: 66
Discharge: HOME | End: 2021-06-07
Payer: MEDICARE

## 2021-06-07 VITALS — SYSTOLIC BLOOD PRESSURE: 194 MMHG | HEART RATE: 74 BPM | DIASTOLIC BLOOD PRESSURE: 127 MMHG

## 2021-06-07 VITALS — OXYGEN SATURATION: 96 %

## 2021-06-07 DIAGNOSIS — K59.00: ICD-10-CM

## 2021-06-07 DIAGNOSIS — R10.9: Primary | ICD-10-CM

## 2021-06-07 DIAGNOSIS — I25.2: ICD-10-CM

## 2021-06-07 LAB
ALBUMIN SERPL-MCNC: 4 G/DL (ref 3.5–5)
ALP SERPL-CCNC: 110 U/L (ref 38–126)
ALT SERPL-CCNC: 30 U/L (ref 0–50)
AMYLASE SERPL-CCNC: 75 U/L (ref 30–110)
ANION GAP SERPL CALC-SCNC: 11.8 MEQ/L (ref 5–15)
AST SERPL QL: 33 U/L (ref 17–59)
BASOPHILS # BLD AUTO: 0.04 10*3/UL (ref 0–0.4)
BASOPHILS NFR BLD AUTO: 0.5 % (ref 0–0.4)
BILIRUB BLD-MCNC: 0.4 MG/DL (ref 0.2–1.3)
BUN SERPL-MCNC: 16 MG/DL (ref 9–20)
CALCIUM SPEC-MCNC: 9 MG/DL (ref 8.4–10.2)
CHLORIDE SERPL-SCNC: 104 MMOL/L (ref 98–107)
CO2 SERPL-SCNC: 26 MMOL/L (ref 22–30)
CREAT SERPL-MCNC: 1.13 MG/DL (ref 0.66–1.25)
EOSINOPHIL # BLD AUTO: 0.15 10*3/UL (ref 0–0.5)
GFR SERPLBLD BASED ON 1.73 SQ M-ARVRAT: > 60 ML/MIN
GLUCOSE SERPL-MCNC: 98 MG/DL (ref 74–106)
GLUCOSE UR-MCNC: NEGATIVE MG/DL
HCT VFR BLD AUTO: 46.5 % (ref 42–50)
HGB BLD-MCNC: 15 GM/DL (ref 12.5–18)
LIPASE SERPL-CCNC: 147 U/L (ref 23–300)
LYMPHOCYTES # SPEC AUTO: 2.66 10*3/UL (ref 1–4.6)
MCH RBC QN AUTO: 27.4 PG (ref 26–32)
MCHC RBC AUTO-ENTMCNC: 32.3 G/DL (ref 32–36)
MONOCYTES # BLD AUTO: 0.83 10*3/UL (ref 0–1.3)
PLATELET # BLD AUTO: 248 K/MM3 (ref 150–450)
POTASSIUM SERPLBLD-SCNC: 4.4 MMOL/L (ref 3.5–5.1)
PROT SERPL-MCNC: 6.9 G/DL (ref 6.3–8.2)
PROT UR STRIP-MCNC: NEGATIVE MG/DL
RBC # BLD AUTO: 5.47 M/MM3 (ref 4.1–5.6)
RBC #/AREA URNS HPF: (no result) /HPF (ref 0–2)
SODIUM SERPL-SCNC: 137 MMOL/L (ref 137–145)
WBC # BLD AUTO: 7.4 K/MM3 (ref 4–10.5)
WBC #/AREA URNS HPF: (no result) /HPF (ref 0–5)

## 2021-06-07 PROCEDURE — 84484 ASSAY OF TROPONIN QUANT: CPT

## 2021-06-07 PROCEDURE — 83690 ASSAY OF LIPASE: CPT

## 2021-06-07 PROCEDURE — 36415 COLL VENOUS BLD VENIPUNCTURE: CPT

## 2021-06-07 PROCEDURE — 82150 ASSAY OF AMYLASE: CPT

## 2021-06-07 PROCEDURE — 80053 COMPREHEN METABOLIC PANEL: CPT

## 2021-06-07 PROCEDURE — 74176 CT ABD & PELVIS W/O CONTRAST: CPT

## 2021-06-07 PROCEDURE — 85025 COMPLETE CBC W/AUTO DIFF WBC: CPT

## 2021-06-07 PROCEDURE — 99284 EMERGENCY DEPT VISIT MOD MDM: CPT

## 2021-06-07 PROCEDURE — 96374 THER/PROPH/DIAG INJ IV PUSH: CPT

## 2021-06-07 PROCEDURE — 81001 URINALYSIS AUTO W/SCOPE: CPT

## 2021-06-07 NOTE — ERPHSYRPT
- History of Present Illness


Historian: patient


Exam Limitations: other (Poor historian)


Patient Subjective Stated Complaint: pt states "I have stomach pain around 3 

today. I'm a frequent flier"


Triage Nursing Assessment: pt came into the er via ambulance; pt is axo x4; pt 

is talkative; c/o rt abd pain; pt states that pain began at 1500 today; pt 

states that pain comes and goes; pt states 10/10 when abd starts; abd is firm, 

distended; active bowel sounds in all quads; pt states that he has a sharp pain 

in the RLQ that radiates to rt back; pt states he had a bowel movement prior to 

coming in; pt denies N/V; hypertensive


Physician History: 





66 yo wm w R flank/R mid-quadrant pain x 5 hours. Pain described as sharp and 

nothing makes better or worse. He denies 

N/V/D/fever/melena/hematochezia/dysuria/hematuria. He has had an appy/bowel 

resection. Pain is 3/10 at present but has been a 10.


Timing/Duration: other (5hrs)


Quality: stabbing


Abdominal Pain Onset Location: other (R abdomen)


Pain Radiation: no radiation


Severity of Pain-Max: severe


Severity of Pain-Current: none


Modifying Factors: Improves With: nothing


Associated Symptoms: No back, No chest pain, No diaphoresis, No diarrhea, No 

fever/chills, No fatigue, No headache, No heartburn, No loss of appetite, No 

nausea, No neck pain, No rash, No shortness of breath, No syncope, No vomiting, 

No weakness


Previous symptoms: no prior history


Allergies/Adverse Reactions: 








No Known Drug Allergies Allergy (Verified 02/26/21 06:43)


   





Home Medications: 








No Reportable Medications [No Reported Medications]  06/07/21 [History]





Hx Tetanus, Diphtheria Vaccination/Date Given: Yes


Hx Influenza Vaccination/Date Given: No


Hx Pneumococcal Vaccination/Date Given: No





Travel Risk





- International Travel


Have you traveled outside of the country in past 3 weeks: No





- Coronavirus Screening


Are you exhibiting any of the following symptoms?: No


Close contact with a COVID-19 positive Pt in past 14-21 Days: No





- Vaccine Status


Have you recieved a Covid-19 vaccination: No





- Review of Systems


Constitutional: No Symptoms


Eyes: No Symptoms


Ears, Nose, & Throat: No Symptoms


Respiratory: No Symptoms


Abdominal/Gastrointestinal: Abdominal Pain, No Nausea, No Vomiting, No Diarrhea,

 No Constipation, No Hematemesis, No Hematochezia, No Melena, No Dysphagia, No 

Appetite Changes


Genitourinary Symptoms: No Symptoms


Musculoskeletal: No Symptoms


Skin: No Symptoms


Neurological: No Symptoms


Psychological: No Symptoms


Endocrine: No Symptoms


Hematologic/Lymphatic: No Symptoms


Immunological/Allergic: No Symptoms





- Past Medical History


Pertinent Past Medical History: Yes


Neurological History: No Pertinent History


ENT History: No Pertinent History


Cardiac History: No Pertinent History, Myocardial Infarction (MI), Other


Respiratory History: No Pertinent History


Endocrine Medical History: No Pertinent History


Musculoskeletal History: No Pertinent History


GI Medical History: No Pertinent History, GI Bleed


 History: No Pertinent History


Psycho-Social History: No Pertinent History


Male Reproductive Disorders: No Pertinent History


Other Medical History: kidney stones x 3,mvp, reports heart attack years ago





- Past Surgical History


Past Surgical History: Yes


Neuro Surgical History: No Pertinent History


Cardiac: No Pertinent History


Respiratory: No Pertinent History


Gastrointestinal: Appendectomy, Bowel Surgery


Genitourinary: Other


Musculoskeletal: Orthopedic Surgery


Male Surgical History: No Pertinent History


Other Surgical History: Pt states "i had to have ,because of infection, some of 

my bowel and piece of the bladder removed and also appy at the same time".  

States "kyphoplasty L-3 and either L-1 or 2  Nov. 2016.  back surgery x4





- Social History


Smoking Status: Former smoker


How long have you smoked: 50 yrs


Exposure to second hand smoke: No


Drug Use: none


Patient Lives Alone: No


Significant Family History: no pertinent family hx





- Nursing Vital Signs


Nursing Vital Signs: 


                               Initial Vital Signs











Temperature  98.1 F   06/07/21 19:49


 


Pulse Rate  82   06/07/21 19:49


 


Respiratory Rate  18   06/07/21 19:49


 


Blood Pressure  184/110   06/07/21 19:49


 


O2 Sat by Pulse Oximetry  96   06/07/21 19:49








                                   Pain Scale











Pain Intensity                 3











Hypertensive





- Physical Exam


General Appearance: no apparent distress


Eye Exam: PERRL/EOMI, eyes nml inspection


Ears, Nose, Throat Exam: normal ENT inspection, TMs normal, pharynx normal, 

moist mucous membranes


Neck Exam: normal inspection, non-tender, supple, full range of motion, No 

meningismus, No mass, No Brudzinski


Respiratory Exam: normal breath sounds, lungs clear, airway intact


Cardiovascular Exam: regular rate/rhythm, normal heart sounds, normal peripheral

 pulses, No murmur


Gastrointestinal/Abdomen Exam: tenderness (Mild R mid-quadrant ttp), distention


Back Exam: normal inspection, normal range of motion, No CVA tenderness


Extremity Exam: normal inspection, normal range of motion


Neurologic Exam: alert, oriented x 3, cooperative, CNs II-XII nml as tested, 

normal mood/affect, sensation nml, No motor deficits, No sensory deficit


Skin Exam: normal color, warm, dry


Lymphatic Exam: No adenopathy


**SpO2 Interpretation**: normal


SpO2: 96


O2 Delivery: Room Air





- CT Exams


  ** Abdomen/Pelvis


CT Interpretation: Tele-radiologist Report (Nothing acute/constipation)


Ordered Tests: 


                               Active Orders 24 hr











 Category Date Time Status


 


 ABDOMEN AND PELVIS W/0 CONTRAS [CT] Stat Exams  06/07/21 21:36 Taken


 


 AMYLASE Stat Lab  06/07/21 20:25 Completed


 


 CBC W DIFF Stat Lab  06/07/21 20:25 Completed


 


 CMP Stat Lab  06/07/21 20:25 Completed


 


 LIPASE Stat Lab  06/07/21 20:25 Completed


 


 TROPONIN Q3H Lab  06/07/21 20:25 Completed


 


 TROPONIN Q3H Lab  06/07/21 23:15 Ordered


 


 TROPONIN Q3H Lab  06/08/21 02:15 Ordered


 


 TROPONIN Q3H Lab  06/08/21 05:15 Ordered


 


 TROPONIN Q3H Lab  06/08/21 08:15 Ordered


 


 UA W/RFX UR CULTURE Stat Lab  06/07/21 21:07 Completed








Medication Summary














Discontinued Medications














Generic Name Dose Route Start Last Admin





  Trade Name Héctor  PRN Reason Stop Dose Admin


 


Ketorolac Tromethamine  30 mg  06/07/21 20:50  06/07/21 21:03





  Toradol 30 Mg Injection***  IV  06/07/21 20:51  30 mg





  STAT ONE   Administration


 


Ketorolac Tromethamine  Confirm  06/07/21 21:03 





  Toradol 30 Mg Injection***  Administered  06/07/21 21:04 





  Dose  





  30 mg  





  .ROUTE  





  .[facility] ONE  











Lab/Rad Data: 


                           Laboratory Result Diagrams





                                 06/07/21 20:25 





                                 06/07/21 20:25 





                               Laboratory Results











  06/07/21 06/07/21 06/07/21 Range/Units





  21:07 20:25 20:25 


 


WBC     (4.0-10.5)  K/mm3


 


RBC     (4.1-5.6)  M/mm3


 


Hgb     (12.5-18.0)  gm/dl


 


Hct     (42-50)  %


 


MCV     ()  fl


 


MCH     (26-32)  pg


 


MCHC     (32-36)  g/dl


 


RDW     (11.5-14.0)  %


 


Plt Count     (150-450)  K/mm3


 


MPV     (7.5-11.0)  fl


 


Gran %     (36.0-66.0)  %


 


Eos # (Auto)     (0-0.5)  


 


Absolute Lymphs (auto)     (1.0-4.6)  


 


Absolute Monos (auto)     (0.0-1.3)  


 


Lymphocytes %     (24.0-44.0)  %


 


Monocytes %     (0.0-12.0)  %


 


Eosinophils %     (0.00-5.0)  %


 


Basophils %     (0.0-0.4)  %


 


Absolute Granulocytes     (1.4-6.9)  


 


Basophils #     (0-0.4)  


 


Sodium    137  (137-145)  mmol/L


 


Potassium    4.4  (3.5-5.1)  mmol/L


 


Chloride    104  ()  mmol/L


 


Carbon Dioxide    26  (22-30)  mmol/L


 


Anion Gap    11.8  (5-15)  MEQ/L


 


BUN    16  (9-20)  mg/dL


 


Creatinine    1.13  (0.66-1.25)  mg/dL


 


Estimated GFR    > 60.0  ML/MIN


 


Glucose    98  ()  mg/dL


 


Calcium    9.0  (8.4-10.2)  mg/dL


 


Total Bilirubin    0.40  (0.2-1.3)  mg/dL


 


AST    33  (17-59)  U/L


 


ALT    30  (0-50)  U/L


 


Alkaline Phosphatase    110  ()  U/L


 


Troponin I   < 0.012   (0.000-0.034)  ng/mL


 


Serum Total Protein    6.9  (6.3-8.2)  g/dL


 


Albumin    4.0  (3.5-5.0)  g/dL


 


Amylase    75  ()  U/L


 


Lipase    147  ()  U/L


 


Urine Color  YELLOW    (YELLOW)  


 


Urine Appearance  CLEAR    (CLEAR)  


 


Urine pH  5.0    (5-6)  


 


Ur Specific Gravity  1.021    (1.005-1.025)  


 


Urine Protein  NEGATIVE    (Negative)  


 


Urine Ketones  NEGATIVE    (NEGATIVE)  


 


Urine Blood  SMALL    (0-5)  Wale/ul


 


Urine Nitrite  NEGATIVE    (NEGATIVE)  


 


Urine Bilirubin  NEGATIVE    (NEGATIVE)  


 


Urine Urobilinogen  2    (0-1)  mg/dL


 


Ur Leukocyte Esterase  NEGATIVE    (NEGATIVE)  


 


Urine WBC (Auto)  NONE    (0-5)  /HPF


 


Urine RBC (Auto)  0-2    (0-2)  /HPF


 


U Epithel Cells (Auto)  NONE    (FEW)  /HPF


 


Urine Bacteria (Auto)  NONE    (NEGATIVE)  /HPF


 


Urine Mucus (Auto)  SLIGHT    (NEGATIVE)  /HPF


 


Urine Culture Reflexed  NO    (NO)  


 


Urine Glucose  NEGATIVE    (NEGATIVE)  mg/dL














  06/07/21 Range/Units





  20:25 


 


WBC  7.4  (4.0-10.5)  K/mm3


 


RBC  5.47  (4.1-5.6)  M/mm3


 


Hgb  15.0  (12.5-18.0)  gm/dl


 


Hct  46.5  (42-50)  %


 


MCV  85.0  ()  fl


 


MCH  27.4  (26-32)  pg


 


MCHC  32.3  (32-36)  g/dl


 


RDW  12.9  (11.5-14.0)  %


 


Plt Count  248  (150-450)  K/mm3


 


MPV  9.3  (7.5-11.0)  fl


 


Gran %  50.5  (36.0-66.0)  %


 


Eos # (Auto)  0.15  (0-0.5)  


 


Absolute Lymphs (auto)  2.66  (1.0-4.6)  


 


Absolute Monos (auto)  0.83  (0.0-1.3)  


 


Lymphocytes %  35.8  (24.0-44.0)  %


 


Monocytes %  11.2  (0.0-12.0)  %


 


Eosinophils %  2.0  (0.00-5.0)  %


 


Basophils %  0.5  (0.0-0.4)  %


 


Absolute Granulocytes  3.74  (1.4-6.9)  


 


Basophils #  0.04  (0-0.4)  


 


Sodium   (137-145)  mmol/L


 


Potassium   (3.5-5.1)  mmol/L


 


Chloride   ()  mmol/L


 


Carbon Dioxide   (22-30)  mmol/L


 


Anion Gap   (5-15)  MEQ/L


 


BUN   (9-20)  mg/dL


 


Creatinine   (0.66-1.25)  mg/dL


 


Estimated GFR   ML/MIN


 


Glucose   ()  mg/dL


 


Calcium   (8.4-10.2)  mg/dL


 


Total Bilirubin   (0.2-1.3)  mg/dL


 


AST   (17-59)  U/L


 


ALT   (0-50)  U/L


 


Alkaline Phosphatase   ()  U/L


 


Troponin I   (0.000-0.034)  ng/mL


 


Serum Total Protein   (6.3-8.2)  g/dL


 


Albumin   (3.5-5.0)  g/dL


 


Amylase   ()  U/L


 


Lipase   ()  U/L


 


Urine Color   (YELLOW)  


 


Urine Appearance   (CLEAR)  


 


Urine pH   (5-6)  


 


Ur Specific Gravity   (1.005-1.025)  


 


Urine Protein   (Negative)  


 


Urine Ketones   (NEGATIVE)  


 


Urine Blood   (0-5)  Wale/ul


 


Urine Nitrite   (NEGATIVE)  


 


Urine Bilirubin   (NEGATIVE)  


 


Urine Urobilinogen   (0-1)  mg/dL


 


Ur Leukocyte Esterase   (NEGATIVE)  


 


Urine WBC (Auto)   (0-5)  /HPF


 


Urine RBC (Auto)   (0-2)  /HPF


 


U Epithel Cells (Auto)   (FEW)  /HPF


 


Urine Bacteria (Auto)   (NEGATIVE)  /HPF


 


Urine Mucus (Auto)   (NEGATIVE)  /HPF


 


Urine Culture Reflexed   (NO)  


 


Urine Glucose   (NEGATIVE)  mg/dL














- Progress


Progress Note: 





06/07/21 23:05


30mg IV toradol


06/07/21 23:07


Pt advised to try a laxative and to f/u with his PCP in 1-2 days.


Counseled pt/family regarding: lab results, diagnosis, need for follow-up, rad 

results





- Departure


Departure Disposition: Home


Clinical Impression: 


 Abdominal pain, Constipation





Condition: Stable


Critical Care Time: No


Referrals: 


KRISSY KENDALL [NON-STAFF PHY W/O PRIVILEGES] - 


Instructions:  Acute Abdomen (Belly Pain), Constipation, Adult (DC)


Additional Instructions: 


Try a laxative like Miralax


Follow up with your family MD in 1-2 days


Return to ER for increasing pain or temperature greater than 100.5

## 2021-06-08 NOTE — XRAY
Indication: Right flank/right lower quadrant pain.



Multiple contiguous axial images obtained through the abdomen and pelvis

without contrast.



Comparison: February 26, 2021.



Lung bases again demonstrates minimal fibrosis/scarring and tiny left base

calcified granuloma.  No infiltrate or effusion.  Heart is not enlarged.



Noncontrasted stomach and bowel loops nonobstructed with again the minimal

descending/sigmoid diverticulosis.  Again appendectomy reported.  Stable

nonobstructing left renal punctate calculi.  No free fluid/air.



Remaining liver, gallbladder, pancreas, spleen, adrenal glands, kidneys,

ureters, and bladder are unremarkable for noncontrast exam.  Stable moderate

scattered aortoiliac calcifications without AAA.



Osseous structures intact again with L1/L2 kyphoplasty and L5-S1 posterior

fusion surgery.



Impression:

1.  Stable nonobstructing left renal micro-calculi, colonic diverticulosis,

and chronic bony findings.

2.  Remaining CT abdomen/pelvis without contrast exam is negative.



Comment: Preliminary interpretation was made by [facility].  No critical discrepancy.